# Patient Record
Sex: FEMALE | Race: WHITE | HISPANIC OR LATINO
[De-identification: names, ages, dates, MRNs, and addresses within clinical notes are randomized per-mention and may not be internally consistent; named-entity substitution may affect disease eponyms.]

---

## 2018-03-05 PROBLEM — Z00.00 ENCOUNTER FOR PREVENTIVE HEALTH EXAMINATION: Status: ACTIVE | Noted: 2018-03-05

## 2018-03-30 ENCOUNTER — APPOINTMENT (OUTPATIENT)
Dept: UROLOGY | Facility: CLINIC | Age: 33
End: 2018-03-30
Payer: MEDICAID

## 2018-03-30 DIAGNOSIS — Z83.3 FAMILY HISTORY OF DIABETES MELLITUS: ICD-10-CM

## 2018-03-30 DIAGNOSIS — Z78.9 OTHER SPECIFIED HEALTH STATUS: ICD-10-CM

## 2018-03-30 DIAGNOSIS — Z87.19 PERSONAL HISTORY OF OTHER DISEASES OF THE DIGESTIVE SYSTEM: ICD-10-CM

## 2018-03-30 LAB
BILIRUB UR QL STRIP: NORMAL
CLARITY UR: CLEAR
COLLECTION METHOD: NORMAL
GLUCOSE UR-MCNC: NORMAL
HCG UR QL: 0.2 EU/DL
HGB UR QL STRIP.AUTO: NORMAL
KETONES UR-MCNC: NORMAL
LEUKOCYTE ESTERASE UR QL STRIP: NORMAL
NITRITE UR QL STRIP: NORMAL
PH UR STRIP: 7.5
PROT UR STRIP-MCNC: NORMAL
SP GR UR STRIP: 1.02

## 2018-03-30 PROCEDURE — 99204 OFFICE O/P NEW MOD 45 MIN: CPT

## 2018-03-30 PROCEDURE — 81003 URINALYSIS AUTO W/O SCOPE: CPT | Mod: QW

## 2018-03-30 PROCEDURE — 51798 US URINE CAPACITY MEASURE: CPT

## 2018-03-30 RX ORDER — DIAPER,BRIEF,INFANT-TODD,DISP
95 EACH MISCELLANEOUS
Qty: 1 | Refills: 5 | Status: ACTIVE | COMMUNITY
Start: 2018-03-30 | End: 1900-01-01

## 2018-04-02 LAB — BACTERIA UR CULT: NORMAL

## 2018-04-03 LAB
APPEARANCE: CLEAR
BACTERIA: ABNORMAL
BILIRUBIN URINE: NEGATIVE
BLOOD URINE: NEGATIVE
COLOR: YELLOW
GLUCOSE QUALITATIVE U: NEGATIVE MG/DL
HYALINE CASTS: 2 /LPF
KETONES URINE: NEGATIVE
LEUKOCYTE ESTERASE URINE: NEGATIVE
MICROSCOPIC-UA: NORMAL
NITRITE URINE: NEGATIVE
PH URINE: 8
PROTEIN URINE: NEGATIVE MG/DL
RED BLOOD CELLS URINE: 3 /HPF
SPECIFIC GRAVITY URINE: 1.02
SQUAMOUS EPITHELIAL CELLS: 2 /HPF
UROBILINOGEN URINE: NEGATIVE MG/DL
WHITE BLOOD CELLS URINE: 1 /HPF

## 2018-04-07 PROBLEM — Z83.3 FAMILY HISTORY OF DIABETES MELLITUS: Status: ACTIVE | Noted: 2018-04-07

## 2018-04-07 PROBLEM — Z78.9 NON-SMOKER: Status: ACTIVE | Noted: 2018-04-07

## 2018-04-07 PROBLEM — Z87.19 HISTORY OF HEMORRHOIDS: Status: RESOLVED | Noted: 2018-04-07 | Resolved: 2018-04-07

## 2018-05-11 ENCOUNTER — APPOINTMENT (OUTPATIENT)
Dept: UROLOGY | Facility: CLINIC | Age: 33
End: 2018-05-11
Payer: MEDICAID

## 2018-05-11 PROCEDURE — 51798 US URINE CAPACITY MEASURE: CPT

## 2018-05-11 PROCEDURE — 99213 OFFICE O/P EST LOW 20 MIN: CPT

## 2018-05-12 RX ORDER — PHENAZOPYRIDINE HYDROCHLORIDE 200 MG/1
200 TABLET ORAL
Qty: 15 | Refills: 0 | Status: COMPLETED | COMMUNITY
Start: 2017-11-09

## 2018-05-12 RX ORDER — IBUPROFEN 600 MG/1
600 TABLET, FILM COATED ORAL
Qty: 60 | Refills: 0 | Status: COMPLETED | COMMUNITY
Start: 2017-11-09

## 2018-06-18 ENCOUNTER — APPOINTMENT (OUTPATIENT)
Dept: UROLOGY | Facility: CLINIC | Age: 33
End: 2018-06-18

## 2019-12-17 ENCOUNTER — EMERGENCY (EMERGENCY)
Facility: HOSPITAL | Age: 34
LOS: 1 days | End: 2019-12-17
Admitting: EMERGENCY MEDICINE
Payer: MEDICAID

## 2019-12-17 PROCEDURE — 76801 OB US < 14 WKS SINGLE FETUS: CPT | Mod: 26

## 2019-12-17 PROCEDURE — 76817 TRANSVAGINAL US OBSTETRIC: CPT | Mod: 26

## 2019-12-17 PROCEDURE — 99284 EMERGENCY DEPT VISIT MOD MDM: CPT

## 2020-01-16 ENCOUNTER — ASOB RESULT (OUTPATIENT)
Age: 35
End: 2020-01-16

## 2020-01-16 ENCOUNTER — APPOINTMENT (OUTPATIENT)
Dept: ANTEPARTUM | Facility: CLINIC | Age: 35
End: 2020-01-16
Payer: MEDICAID

## 2020-01-16 PROCEDURE — 76801 OB US < 14 WKS SINGLE FETUS: CPT

## 2020-02-06 ENCOUNTER — APPOINTMENT (OUTPATIENT)
Dept: MATERNAL FETAL MEDICINE | Facility: CLINIC | Age: 35
End: 2020-02-06
Payer: MEDICAID

## 2020-02-06 PROCEDURE — 99203 OFFICE O/P NEW LOW 30 MIN: CPT | Mod: TH

## 2020-03-05 ENCOUNTER — ASOB RESULT (OUTPATIENT)
Age: 35
End: 2020-03-05

## 2020-03-05 ENCOUNTER — APPOINTMENT (OUTPATIENT)
Dept: ANTEPARTUM | Facility: CLINIC | Age: 35
End: 2020-03-05
Payer: MEDICAID

## 2020-03-05 PROCEDURE — 76805 OB US >/= 14 WKS SNGL FETUS: CPT

## 2020-03-05 PROCEDURE — 76817 TRANSVAGINAL US OBSTETRIC: CPT

## 2020-03-19 ENCOUNTER — APPOINTMENT (OUTPATIENT)
Dept: ANTEPARTUM | Facility: CLINIC | Age: 35
End: 2020-03-19
Payer: MEDICAID

## 2020-03-19 ENCOUNTER — ASOB RESULT (OUTPATIENT)
Age: 35
End: 2020-03-19

## 2020-03-19 PROCEDURE — 76816 OB US FOLLOW-UP PER FETUS: CPT

## 2020-05-14 ENCOUNTER — APPOINTMENT (OUTPATIENT)
Dept: ANTEPARTUM | Facility: CLINIC | Age: 35
End: 2020-05-14
Payer: MEDICAID

## 2020-05-14 ENCOUNTER — ASOB RESULT (OUTPATIENT)
Age: 35
End: 2020-05-14

## 2020-05-14 PROCEDURE — 76819 FETAL BIOPHYS PROFIL W/O NST: CPT

## 2020-05-14 PROCEDURE — 76816 OB US FOLLOW-UP PER FETUS: CPT

## 2020-06-25 ENCOUNTER — APPOINTMENT (OUTPATIENT)
Dept: ANTEPARTUM | Facility: CLINIC | Age: 35
End: 2020-06-25
Payer: MEDICAID

## 2020-06-25 ENCOUNTER — ASOB RESULT (OUTPATIENT)
Age: 35
End: 2020-06-25

## 2020-06-25 PROCEDURE — 76819 FETAL BIOPHYS PROFIL W/O NST: CPT

## 2020-06-25 PROCEDURE — 76820 UMBILICAL ARTERY ECHO: CPT

## 2020-06-25 PROCEDURE — 76816 OB US FOLLOW-UP PER FETUS: CPT

## 2020-06-25 PROCEDURE — 76821 MIDDLE CEREBRAL ARTERY ECHO: CPT

## 2020-07-01 ENCOUNTER — ASOB RESULT (OUTPATIENT)
Age: 35
End: 2020-07-01

## 2020-07-01 ENCOUNTER — APPOINTMENT (OUTPATIENT)
Dept: ANTEPARTUM | Facility: CLINIC | Age: 35
End: 2020-07-01
Payer: MEDICAID

## 2020-07-01 PROCEDURE — 76819 FETAL BIOPHYS PROFIL W/O NST: CPT

## 2020-07-01 PROCEDURE — 76821 MIDDLE CEREBRAL ARTERY ECHO: CPT | Mod: 59

## 2020-07-01 PROCEDURE — 76820 UMBILICAL ARTERY ECHO: CPT

## 2020-07-08 ENCOUNTER — ASOB RESULT (OUTPATIENT)
Age: 35
End: 2020-07-08

## 2020-07-08 ENCOUNTER — APPOINTMENT (OUTPATIENT)
Dept: ANTEPARTUM | Facility: CLINIC | Age: 35
End: 2020-07-08
Payer: MEDICAID

## 2020-07-08 PROCEDURE — 76819 FETAL BIOPHYS PROFIL W/O NST: CPT

## 2020-07-13 ENCOUNTER — INPATIENT (INPATIENT)
Facility: HOSPITAL | Age: 35
LOS: 1 days | Discharge: HOME | End: 2020-07-15
Attending: OBSTETRICS & GYNECOLOGY | Admitting: OBSTETRICS & GYNECOLOGY

## 2020-07-13 ENCOUNTER — OUTPATIENT (OUTPATIENT)
Dept: OUTPATIENT SERVICES | Facility: HOSPITAL | Age: 35
LOS: 1 days | End: 2020-07-13

## 2021-12-16 ENCOUNTER — ASOB RESULT (OUTPATIENT)
Age: 36
End: 2021-12-16

## 2021-12-16 ENCOUNTER — APPOINTMENT (OUTPATIENT)
Dept: ANTEPARTUM | Facility: CLINIC | Age: 36
End: 2021-12-16
Payer: MEDICAID

## 2021-12-16 PROCEDURE — 76801 OB US < 14 WKS SINGLE FETUS: CPT

## 2022-01-11 ENCOUNTER — EMERGENCY (EMERGENCY)
Facility: HOSPITAL | Age: 37
LOS: 1 days | End: 2022-01-11
Admitting: EMERGENCY MEDICINE
Payer: MEDICAID

## 2022-01-11 PROCEDURE — 99284 EMERGENCY DEPT VISIT MOD MDM: CPT

## 2022-01-27 DIAGNOSIS — U07.1 COVID-19: ICD-10-CM

## 2022-01-27 DIAGNOSIS — R51.9 HEADACHE, UNSPECIFIED: ICD-10-CM

## 2022-01-27 DIAGNOSIS — M79.18 MYALGIA, OTHER SITE: ICD-10-CM

## 2022-01-27 DIAGNOSIS — Z3A.13 13 WEEKS GESTATION OF PREGNANCY: ICD-10-CM

## 2022-01-27 DIAGNOSIS — O99.891 OTHER SPECIFIED DISEASES AND CONDITIONS COMPLICATING PREGNANCY: ICD-10-CM

## 2022-01-27 DIAGNOSIS — O98.511 OTHER VIRAL DISEASES COMPLICATING PREGNANCY, FIRST TRIMESTER: ICD-10-CM

## 2022-02-01 ENCOUNTER — APPOINTMENT (OUTPATIENT)
Dept: ANTEPARTUM | Facility: CLINIC | Age: 37
End: 2022-02-01
Payer: MEDICAID

## 2022-02-01 ENCOUNTER — APPOINTMENT (OUTPATIENT)
Dept: MATERNAL FETAL MEDICINE | Facility: CLINIC | Age: 37
End: 2022-02-01
Payer: MEDICAID

## 2022-02-01 ENCOUNTER — ASOB RESULT (OUTPATIENT)
Age: 37
End: 2022-02-01

## 2022-02-01 VITALS
HEIGHT: 62 IN | HEART RATE: 93 BPM | SYSTOLIC BLOOD PRESSURE: 118 MMHG | DIASTOLIC BLOOD PRESSURE: 70 MMHG | OXYGEN SATURATION: 99 % | RESPIRATION RATE: 18 BRPM | WEIGHT: 156.19 LBS | BODY MASS INDEX: 28.74 KG/M2

## 2022-02-01 DIAGNOSIS — O09.299 SUPERVISION OF PREGNANCY WITH OTHER POOR REPRODUCTIVE OR OBSTETRIC HISTORY, UNSPECIFIED TRIMESTER: ICD-10-CM

## 2022-02-01 DIAGNOSIS — Z87.442 PERSONAL HISTORY OF URINARY CALCULI: ICD-10-CM

## 2022-02-01 DIAGNOSIS — U07.1 COVID-19: ICD-10-CM

## 2022-02-01 DIAGNOSIS — O34.219 MATERNAL CARE FOR UNSPECIFIED TYPE SCAR FROM PREVIOUS CESAREAN DELIVERY: ICD-10-CM

## 2022-02-01 PROCEDURE — 76815 OB US LIMITED FETUS(S): CPT

## 2022-02-01 PROCEDURE — 99213 OFFICE O/P EST LOW 20 MIN: CPT

## 2022-02-01 RX ORDER — VITAMIN C, CALCIUM, IRON, VITAMIN D3, VITAMIN E, VITAMIN B1, VITAMIN B2, VITAMIN B3, VITAMIN B6, FOLIC ACID, IODINE, ZINC, COPPER, DOCUSATE SODIUM, DOCOSAHEXAENOIC ACID (DHA) 27-1-50 MG
KIT ORAL
Refills: 0 | Status: ACTIVE | COMMUNITY

## 2022-02-01 NOTE — DATA REVIEWED
[FreeTextEntry1] : Sonogram today shows an appropriate fetal size for 17+ weeks at the 83rd percentile. \par \par REview of past OB history is significant for a prior C/s in [place] due to preeclampsia. Her second delivery was in 2020 in [place] at 39 weeks, without any issues with preeclampsia. . Both pregnancies were over 8 lbs, and were uneventful until the end of the pregnancy. \par \par This current pregnancy is the third, and has been uneventful, and she has started with baby aspirin for prophylaxis

## 2022-03-02 ENCOUNTER — ASOB RESULT (OUTPATIENT)
Age: 37
End: 2022-03-02

## 2022-03-02 ENCOUNTER — APPOINTMENT (OUTPATIENT)
Dept: ANTEPARTUM | Facility: CLINIC | Age: 37
End: 2022-03-02
Payer: MEDICAID

## 2022-03-02 PROCEDURE — 76811 OB US DETAILED SNGL FETUS: CPT

## 2022-03-02 PROCEDURE — 76817 TRANSVAGINAL US OBSTETRIC: CPT

## 2022-05-25 ENCOUNTER — APPOINTMENT (OUTPATIENT)
Dept: ANTEPARTUM | Facility: CLINIC | Age: 37
End: 2022-05-25
Payer: MEDICAID

## 2022-05-25 ENCOUNTER — ASOB RESULT (OUTPATIENT)
Age: 37
End: 2022-05-25

## 2022-05-25 PROCEDURE — 76816 OB US FOLLOW-UP PER FETUS: CPT

## 2022-05-25 PROCEDURE — 76819 FETAL BIOPHYS PROFIL W/O NST: CPT

## 2022-05-31 ENCOUNTER — APPOINTMENT (OUTPATIENT)
Dept: MATERNAL FETAL MEDICINE | Facility: CLINIC | Age: 37
End: 2022-05-31
Payer: MEDICAID

## 2022-05-31 ENCOUNTER — ASOB RESULT (OUTPATIENT)
Age: 37
End: 2022-05-31

## 2022-05-31 PROCEDURE — G0108 DIAB MANAGE TRN  PER INDIV: CPT | Mod: 95

## 2022-06-13 ENCOUNTER — ASOB RESULT (OUTPATIENT)
Age: 37
End: 2022-06-13

## 2022-06-13 ENCOUNTER — APPOINTMENT (OUTPATIENT)
Dept: MATERNAL FETAL MEDICINE | Facility: CLINIC | Age: 37
End: 2022-06-13
Payer: MEDICAID

## 2022-06-13 ENCOUNTER — APPOINTMENT (OUTPATIENT)
Dept: ANTEPARTUM | Facility: CLINIC | Age: 37
End: 2022-06-13
Payer: MEDICAID

## 2022-06-13 VITALS
BODY MASS INDEX: 32.57 KG/M2 | WEIGHT: 177 LBS | SYSTOLIC BLOOD PRESSURE: 112 MMHG | OXYGEN SATURATION: 98 % | HEART RATE: 82 BPM | HEIGHT: 62 IN | DIASTOLIC BLOOD PRESSURE: 70 MMHG | RESPIRATION RATE: 16 BRPM

## 2022-06-13 VITALS
OXYGEN SATURATION: 98 % | DIASTOLIC BLOOD PRESSURE: 70 MMHG | HEART RATE: 82 BPM | BODY MASS INDEX: 32.57 KG/M2 | HEIGHT: 62 IN | RESPIRATION RATE: 16 BRPM | SYSTOLIC BLOOD PRESSURE: 112 MMHG | WEIGHT: 177 LBS

## 2022-06-13 DIAGNOSIS — R33.9 RETENTION OF URINE, UNSPECIFIED: ICD-10-CM

## 2022-06-13 DIAGNOSIS — Z87.898 PERSONAL HISTORY OF OTHER SPECIFIED CONDITIONS: ICD-10-CM

## 2022-06-13 DIAGNOSIS — O99.213 OBESITY COMPLICATING PREGNANCY, THIRD TRIMESTER: ICD-10-CM

## 2022-06-13 DIAGNOSIS — Z3A.35 35 WEEKS GESTATION OF PREGNANCY: ICD-10-CM

## 2022-06-13 PROCEDURE — 76818 FETAL BIOPHYS PROFILE W/NST: CPT

## 2022-06-13 PROCEDURE — 76820 UMBILICAL ARTERY ECHO: CPT

## 2022-06-13 PROCEDURE — 99215 OFFICE O/P EST HI 40 MIN: CPT | Mod: TH

## 2022-06-13 RX ORDER — ASPIRIN 81 MG/1
81 TABLET, COATED ORAL
Qty: 30 | Refills: 0 | Status: ACTIVE | COMMUNITY
Start: 2021-12-06

## 2022-06-13 RX ORDER — PRENATAL VIT NO.126/IRON/FOLIC 28MG-0.8MG
28-0.8 TABLET ORAL
Qty: 90 | Refills: 0 | Status: ACTIVE | COMMUNITY
Start: 2021-12-06

## 2022-06-13 NOTE — VITALS
[LMP (date): ___] : LMP was on [unfilled] [By LMP] : this is the final MERISSA [GA =___ Weeks] : which calculates to a GA of [unfilled] weeks [GA= ___ Days] : and [unfilled] day(s) [MERISSA by LMP (date): ___] : The calculated MERISSA by LMP is [unfilled]

## 2022-06-13 NOTE — OB HISTORY
[Pregnancy History] : patient received anesthesia [LMP: ___] : LMP: [unfilled] [MERISSA: ___] : MERISSA: [unfilled] [Spontaneous] : Spontaneous conception [Sonogram] : sonogram [at ___ wks] : at [unfilled] weeks [Definite:  ___ (Date)] : the last menstrual period was [unfilled] [___] : Living: [unfilled] [EGA: ___ wks] : EGA: [unfilled] wks [FreeTextEntry1] : She had a maternal-fetal medicine consultation on 2022 because of a history of preeclampsia in a prior pregnancy and 2 prior  deliveries. She currently takes a daily baby aspirin to reduce the risk of developing preeclampsia.\par \par She had a 3-hour GTT on 5/10/2022.  The 1 hour glucose was 79, the 1-hour glucose was 177, the 2-hour glucose was 175, and the 3-hour glucose was 117.  There was 1 elevated glucose value consistent with a diagnosis of impaired glucose tolerance.\par .

## 2022-06-13 NOTE — CHIEF COMPLAINT
[G ___] : G [unfilled] [P ___] : P [unfilled] [de-identified] : having 1 abnormal glucose value on the 3-hour GTT

## 2022-06-13 NOTE — DISCUSSION/SUMMARY
[FreeTextEntry1] : She is 35 weeks and 3 days gestation by her last menstrual period dates.\par \par She is overweight and obesity has been associated with a number of maternal complications such as gestational diabetes, pre-eclampsia, thrombophlebitis, labor abnormalities, post-term pregnancies,  delivery, and operative complications. Obesity has been associated with adverse fetal outcomes such as late stillbirth and  deliveries.  Obese women also have a two to three-fold increased incidence in congenital anomalies. There were no major fetal malformations seen during the fetal anatomy ultrasound examination. She has been diagnosed with impaired glucose tolerance. \par \par Regarding her one abnormal glucose value during the 3 - hour GTT, I told her she has impaired glucose tolerance and the treatment during pregnancy is the same as the treatment for gestational diabetes. A meta-analysis of 25 studies (7 prospective and 18 retrospective) that included 4,466 women with 1 abnormal glucose value on the 3 - hour oral GTT reported significantly worse pregnancy outcomes when compared with women with no abnormal values on the 3 - hour oral GTT  (Angelica ACOSTAT, et al. Am J Obstet Gynecol  2016; 215 (3): 287 - 297).  There was a reported increased risk for: macrosomia, 1.59 (95% confidence interval, 1.16 - 2.19); large for gestational age, 1.38 (95% confidence interval, 1.09- 1.76); increased mean birthweight, 44.5 g (95% confidence interval, 8.10 - 80.80 g);  hypoglycemia, 1.88 (95% confidence interval, 1.05 - 3.38); total  delivery, 1.69 (95% confidence interval, 1.40 - 2.05); pregnancy-induced hypertension, 1.55 (95% confidence interval, 1.31 - 1.83), and Apgar score of <7 at 5 minutes, 6.10 (95% confidence interval, 2.65 - 14.02). There was also an increase in  intensive care unit admission and respiratory distress syndrome. Similar results were seen when comparing 1 abnormal glucose value to a population with a normal 1-hour 50 g glucose challenge test (normal glucose screen). With the exception of birthweight, outcomes of patients with 1 abnormal glucose value were similar to outcomes of patients with gestational diabetes mellitus.\par \par She had a telehealth visit with the diabetes educator on 2022 for initial diabetes education and counseling. She was given instructions on a diabetic diet and taught self glucose monitoring. She states that she has not been following the recommended diabetic diet.  She is not eating 3 meals and 3 snacks. She has been doing fasting and 1 hour postprandial self-glucose monitoring. She did not bring a food or glucose diary for my review. She also did not bring her glucometer.  I gave her dietary counseling.  I encouraged her to eat 3 meals with 3 snacks to reduce postprandial glucose fluctuations.  I informed her that maintaining euglycemia is the most important factor associated with good  outcomes in pregnancies complicated by impaired glucose tolerance during pregnancy. I told her that poor glucose control may cause fetal macrosomia,  respiratory distress syndrome and  metabolic complications such as hypoglycemia and hyperbilirubinemia. I told her that I recommend a 75 gram 2 hour OGTT approximately 6 - 8 weeks postpartum to determine whether she has postpartum impaired glucose tolerance. I believe that she needs more nutritional counseling. I made arrangements for her to see our diabetes educator as soon as possible. I ordered a hemoglobin A1C level.  She was advised to have a follow-up M visit in 2 weeks.

## 2022-06-13 NOTE — FAMILY HISTORY
[Age 35+ During Pregnancy] : not 35 or over during pregnancy [Reported Family History Of Birth Defects] : no congenital heart defects [Lester-Sachs Carrier] : no Lester-Sachs [Family History] : no mental retardation/autism [Reported Family History Of Genetic Disease] : no history of child defect in child of baby father

## 2022-06-14 LAB
ESTIMATED AVERAGE GLUCOSE: 117 MG/DL
HBA1C MFR BLD HPLC: 5.7 %

## 2022-06-20 ENCOUNTER — APPOINTMENT (OUTPATIENT)
Dept: ANTEPARTUM | Facility: CLINIC | Age: 37
End: 2022-06-20
Payer: MEDICAID

## 2022-06-20 ENCOUNTER — ASOB RESULT (OUTPATIENT)
Age: 37
End: 2022-06-20

## 2022-06-20 PROCEDURE — 76816 OB US FOLLOW-UP PER FETUS: CPT

## 2022-06-21 ENCOUNTER — ASOB RESULT (OUTPATIENT)
Age: 37
End: 2022-06-21

## 2022-06-21 ENCOUNTER — APPOINTMENT (OUTPATIENT)
Dept: MATERNAL FETAL MEDICINE | Facility: CLINIC | Age: 37
End: 2022-06-21
Payer: MEDICAID

## 2022-06-21 DIAGNOSIS — O24.419 GESTATIONAL DIABETES MELLITUS IN PREGNANCY, UNSPECIFIED CONTROL: ICD-10-CM

## 2022-06-21 DIAGNOSIS — O99.810 ABNORMAL GLUCOSE COMPLICATING PREGNANCY: ICD-10-CM

## 2022-06-21 PROCEDURE — G0108 DIAB MANAGE TRN  PER INDIV: CPT | Mod: 95

## 2022-06-27 ENCOUNTER — APPOINTMENT (OUTPATIENT)
Dept: MATERNAL FETAL MEDICINE | Facility: CLINIC | Age: 37
End: 2022-06-27

## 2022-06-27 ENCOUNTER — APPOINTMENT (OUTPATIENT)
Dept: ANTEPARTUM | Facility: CLINIC | Age: 37
End: 2022-06-27

## 2022-06-29 ENCOUNTER — APPOINTMENT (OUTPATIENT)
Dept: MATERNAL FETAL MEDICINE | Facility: CLINIC | Age: 37
End: 2022-06-29

## 2022-06-29 ENCOUNTER — ASOB RESULT (OUTPATIENT)
Age: 37
End: 2022-06-29

## 2022-06-29 PROCEDURE — G0108 DIAB MANAGE TRN  PER INDIV: CPT | Mod: 95

## 2022-06-30 ENCOUNTER — ASOB RESULT (OUTPATIENT)
Age: 37
End: 2022-06-30

## 2022-06-30 ENCOUNTER — APPOINTMENT (OUTPATIENT)
Dept: ANTEPARTUM | Facility: CLINIC | Age: 37
End: 2022-06-30

## 2022-06-30 PROCEDURE — 76819 FETAL BIOPHYS PROFIL W/O NST: CPT

## 2022-07-01 ENCOUNTER — OUTPATIENT (OUTPATIENT)
Dept: OUTPATIENT SERVICES | Facility: HOSPITAL | Age: 37
LOS: 1 days | End: 2022-07-01

## 2022-07-05 ENCOUNTER — APPOINTMENT (OUTPATIENT)
Dept: ANTEPARTUM | Facility: CLINIC | Age: 37
End: 2022-07-05

## 2022-07-05 ENCOUNTER — INPATIENT (INPATIENT)
Facility: HOSPITAL | Age: 37
LOS: 1 days | Discharge: HOME | End: 2022-07-07
Attending: OBSTETRICS & GYNECOLOGY | Admitting: OBSTETRICS & GYNECOLOGY

## 2022-07-05 PROCEDURE — 88307 TISSUE EXAM BY PATHOLOGIST: CPT | Mod: 26

## 2022-07-08 DIAGNOSIS — Z01.812 ENCOUNTER FOR PREPROCEDURAL LABORATORY EXAMINATION: ICD-10-CM

## 2022-07-08 DIAGNOSIS — Z33.1 PREGNANT STATE, INCIDENTAL: ICD-10-CM

## 2022-07-12 ENCOUNTER — APPOINTMENT (OUTPATIENT)
Dept: ANTEPARTUM | Facility: CLINIC | Age: 37
End: 2022-07-12

## 2022-07-20 DIAGNOSIS — F39 UNSPECIFIED MOOD [AFFECTIVE] DISORDER: ICD-10-CM

## 2022-07-20 DIAGNOSIS — Z3A.39 39 WEEKS GESTATION OF PREGNANCY: ICD-10-CM

## 2022-07-20 DIAGNOSIS — O94 SEQUELAE OF COMPLICATION OF PREGNANCY, CHILDBIRTH, AND THE PUERPERIUM: ICD-10-CM

## 2022-07-20 DIAGNOSIS — O34.211 MATERNAL CARE FOR LOW TRANSVERSE SCAR FROM PREVIOUS CESAREAN DELIVERY: ICD-10-CM

## 2022-07-20 DIAGNOSIS — Z20.822 CONTACT WITH AND (SUSPECTED) EXPOSURE TO COVID-19: ICD-10-CM
